# Patient Record
Sex: FEMALE | Race: ASIAN
[De-identification: names, ages, dates, MRNs, and addresses within clinical notes are randomized per-mention and may not be internally consistent; named-entity substitution may affect disease eponyms.]

---

## 2018-02-07 ENCOUNTER — HOSPITAL ENCOUNTER (INPATIENT)
Dept: HOSPITAL 72 - EMR | Age: 83
LOS: 5 days | Discharge: NURSING, REHAB OR LONG-TERM CARE | DRG: 871 | End: 2018-02-12
Payer: MEDICARE

## 2018-02-07 VITALS — DIASTOLIC BLOOD PRESSURE: 39 MMHG | SYSTOLIC BLOOD PRESSURE: 81 MMHG

## 2018-02-07 VITALS — HEIGHT: 65 IN | BODY MASS INDEX: 21.49 KG/M2 | WEIGHT: 129 LBS

## 2018-02-07 VITALS — DIASTOLIC BLOOD PRESSURE: 53 MMHG | SYSTOLIC BLOOD PRESSURE: 92 MMHG

## 2018-02-07 DIAGNOSIS — R65.21: ICD-10-CM

## 2018-02-07 DIAGNOSIS — K72.90: ICD-10-CM

## 2018-02-07 DIAGNOSIS — N17.9: ICD-10-CM

## 2018-02-07 DIAGNOSIS — F01.50: ICD-10-CM

## 2018-02-07 DIAGNOSIS — Z79.01: ICD-10-CM

## 2018-02-07 DIAGNOSIS — I11.0: ICD-10-CM

## 2018-02-07 DIAGNOSIS — K74.60: ICD-10-CM

## 2018-02-07 DIAGNOSIS — G92: ICD-10-CM

## 2018-02-07 DIAGNOSIS — E11.9: ICD-10-CM

## 2018-02-07 DIAGNOSIS — N39.0: ICD-10-CM

## 2018-02-07 DIAGNOSIS — Z95.0: ICD-10-CM

## 2018-02-07 DIAGNOSIS — J18.9: ICD-10-CM
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
## 2018-02-07 DIAGNOSIS — A41.9: Primary | ICD-10-CM

## 2018-02-07 DIAGNOSIS — I48.0: ICD-10-CM

## 2018-02-07 DIAGNOSIS — E43: ICD-10-CM

## 2018-02-07 DIAGNOSIS — I25.5: ICD-10-CM

## 2018-02-07 DIAGNOSIS — I50.33: ICD-10-CM

## 2018-02-07 DIAGNOSIS — I21.4: ICD-10-CM

## 2018-02-07 LAB
ADD MANUAL DIFF: NO
ALBUMIN SERPL-MCNC: 2.6 G/DL (ref 3.4–5)
ALBUMIN/GLOB SERPL: 0.9 {RATIO} (ref 1–2.7)
ALP SERPL-CCNC: 57 U/L (ref 46–116)
ALT SERPL-CCNC: 17 U/L (ref 12–78)
ANION GAP SERPL CALC-SCNC: 10 MMOL/L (ref 5–15)
APPEARANCE UR: CLEAR
APTT BLD: 27 SEC (ref 23–33)
APTT PPP: YELLOW S
AST SERPL-CCNC: 24 U/L (ref 15–37)
BASOPHILS NFR BLD AUTO: 0.7 % (ref 0–2)
BILIRUB SERPL-MCNC: 0.5 MG/DL (ref 0.2–1)
BUN SERPL-MCNC: 66 MG/DL (ref 7–18)
CALCIUM SERPL-MCNC: 8.9 MG/DL (ref 8.5–10.1)
CHLORIDE SERPL-SCNC: 102 MMOL/L (ref 98–107)
CK MB SERPL-MCNC: 1.8 NG/ML (ref 0–3.6)
CK SERPL-CCNC: 44 U/L (ref 26–308)
CO2 SERPL-SCNC: 20 MMOL/L (ref 21–32)
CREAT SERPL-MCNC: 3.1 MG/DL (ref 0.55–1.3)
EOSINOPHIL NFR BLD AUTO: 0.4 % (ref 0–3)
ERYTHROCYTE [DISTWIDTH] IN BLOOD BY AUTOMATED COUNT: 13.2 % (ref 11.6–14.8)
GLOBULIN SER-MCNC: 3 G/DL
GLUCOSE UR STRIP-MCNC: NEGATIVE MG/DL
HCT VFR BLD CALC: 38.8 % (ref 37–47)
HGB BLD-MCNC: 13.1 G/DL (ref 12–16)
INR PPP: 1 (ref 0.9–1.1)
KETONES UR QL STRIP: NEGATIVE
LEUKOCYTE ESTERASE UR QL STRIP: (no result)
LYMPHOCYTES NFR BLD AUTO: 27.8 % (ref 20–45)
MCV RBC AUTO: 100 FL (ref 80–99)
MONOCYTES NFR BLD AUTO: 9.5 % (ref 1–10)
NEUTROPHILS NFR BLD AUTO: 61.6 % (ref 45–75)
NITRITE UR QL STRIP: NEGATIVE
PH UR STRIP: 5 [PH] (ref 4.5–8)
PLATELET # BLD: 166 K/UL (ref 150–450)
POTASSIUM SERPL-SCNC: 4.3 MMOL/L (ref 3.5–5.1)
PROT UR QL STRIP: NEGATIVE
RBC # BLD AUTO: 3.88 M/UL (ref 4.2–5.4)
SODIUM SERPL-SCNC: 132 MMOL/L (ref 136–145)
SP GR UR STRIP: 1.01 (ref 1–1.03)
UROBILINOGEN UR-MCNC: 1 MG/DL (ref 0–1)
WBC # BLD AUTO: 8.4 K/UL (ref 4.8–10.8)

## 2018-02-07 PROCEDURE — 71045 X-RAY EXAM CHEST 1 VIEW: CPT

## 2018-02-07 PROCEDURE — 82553 CREATINE MB FRACTION: CPT

## 2018-02-07 PROCEDURE — 84443 ASSAY THYROID STIM HORMONE: CPT

## 2018-02-07 PROCEDURE — 85610 PROTHROMBIN TIME: CPT

## 2018-02-07 PROCEDURE — 84484 ASSAY OF TROPONIN QUANT: CPT

## 2018-02-07 PROCEDURE — 85025 COMPLETE CBC W/AUTO DIFF WBC: CPT

## 2018-02-07 PROCEDURE — 82962 GLUCOSE BLOOD TEST: CPT

## 2018-02-07 PROCEDURE — 70450 CT HEAD/BRAIN W/O DYE: CPT

## 2018-02-07 PROCEDURE — 36415 COLL VENOUS BLD VENIPUNCTURE: CPT

## 2018-02-07 PROCEDURE — 81003 URINALYSIS AUTO W/O SCOPE: CPT

## 2018-02-07 PROCEDURE — 93005 ELECTROCARDIOGRAM TRACING: CPT

## 2018-02-07 PROCEDURE — 83880 ASSAY OF NATRIURETIC PEPTIDE: CPT

## 2018-02-07 PROCEDURE — 82550 ASSAY OF CK (CPK): CPT

## 2018-02-07 PROCEDURE — 87081 CULTURE SCREEN ONLY: CPT

## 2018-02-07 PROCEDURE — 74230 X-RAY XM SWLNG FUNCJ C+: CPT

## 2018-02-07 PROCEDURE — 85730 THROMBOPLASTIN TIME PARTIAL: CPT

## 2018-02-07 PROCEDURE — 87040 BLOOD CULTURE FOR BACTERIA: CPT

## 2018-02-07 PROCEDURE — 80202 ASSAY OF VANCOMYCIN: CPT

## 2018-02-07 PROCEDURE — 93306 TTE W/DOPPLER COMPLETE: CPT

## 2018-02-07 PROCEDURE — 80053 COMPREHEN METABOLIC PANEL: CPT

## 2018-02-07 PROCEDURE — 76775 US EXAM ABDO BACK WALL LIM: CPT

## 2018-02-07 PROCEDURE — 82140 ASSAY OF AMMONIA: CPT

## 2018-02-07 PROCEDURE — 83605 ASSAY OF LACTIC ACID: CPT

## 2018-02-07 PROCEDURE — 51702 INSERT TEMP BLADDER CATH: CPT

## 2018-02-08 VITALS — SYSTOLIC BLOOD PRESSURE: 79 MMHG | DIASTOLIC BLOOD PRESSURE: 46 MMHG

## 2018-02-08 VITALS — SYSTOLIC BLOOD PRESSURE: 99 MMHG | DIASTOLIC BLOOD PRESSURE: 42 MMHG

## 2018-02-08 VITALS — DIASTOLIC BLOOD PRESSURE: 56 MMHG | SYSTOLIC BLOOD PRESSURE: 97 MMHG

## 2018-02-08 VITALS — DIASTOLIC BLOOD PRESSURE: 41 MMHG | SYSTOLIC BLOOD PRESSURE: 83 MMHG

## 2018-02-08 VITALS — DIASTOLIC BLOOD PRESSURE: 41 MMHG | SYSTOLIC BLOOD PRESSURE: 97 MMHG

## 2018-02-08 VITALS — SYSTOLIC BLOOD PRESSURE: 100 MMHG | DIASTOLIC BLOOD PRESSURE: 45 MMHG

## 2018-02-08 VITALS — DIASTOLIC BLOOD PRESSURE: 44 MMHG | SYSTOLIC BLOOD PRESSURE: 84 MMHG

## 2018-02-08 LAB
ADD MANUAL DIFF: NO
ALBUMIN SERPL-MCNC: 2.5 G/DL (ref 3.4–5)
ALBUMIN/GLOB SERPL: 0.8 {RATIO} (ref 1–2.7)
ALP SERPL-CCNC: 70 U/L (ref 46–116)
ALT SERPL-CCNC: 17 U/L (ref 12–78)
ANION GAP SERPL CALC-SCNC: 11 MMOL/L (ref 5–15)
AST SERPL-CCNC: 16 U/L (ref 15–37)
BASOPHILS NFR BLD AUTO: 0.6 % (ref 0–2)
BILIRUB SERPL-MCNC: 0.3 MG/DL (ref 0.2–1)
BUN SERPL-MCNC: 63 MG/DL (ref 7–18)
CALCIUM SERPL-MCNC: 8.1 MG/DL (ref 8.5–10.1)
CHLORIDE SERPL-SCNC: 107 MMOL/L (ref 98–107)
CO2 SERPL-SCNC: 18 MMOL/L (ref 21–32)
CREAT SERPL-MCNC: 2.4 MG/DL (ref 0.55–1.3)
EOSINOPHIL NFR BLD AUTO: 0.2 % (ref 0–3)
ERYTHROCYTE [DISTWIDTH] IN BLOOD BY AUTOMATED COUNT: 13.2 % (ref 11.6–14.8)
GLOBULIN SER-MCNC: 3.1 G/DL
HCT VFR BLD CALC: 40.1 % (ref 37–47)
HGB BLD-MCNC: 13.8 G/DL (ref 12–16)
INR PPP: 1 (ref 0.9–1.1)
LYMPHOCYTES NFR BLD AUTO: 20.7 % (ref 20–45)
MCV RBC AUTO: 102 FL (ref 80–99)
MONOCYTES NFR BLD AUTO: 7.1 % (ref 1–10)
NEUTROPHILS NFR BLD AUTO: 71.3 % (ref 45–75)
PLATELET # BLD: 149 K/UL (ref 150–450)
POTASSIUM SERPL-SCNC: 4.2 MMOL/L (ref 3.5–5.1)
RBC # BLD AUTO: 3.94 M/UL (ref 4.2–5.4)
SODIUM SERPL-SCNC: 136 MMOL/L (ref 136–145)
WBC # BLD AUTO: 9 K/UL (ref 4.8–10.8)

## 2018-02-08 RX ADMIN — SODIUM CHLORIDE SCH MLS/HR: 0.9 INJECTION INTRAVENOUS at 09:50

## 2018-02-08 RX ADMIN — INSULIN ASPART SCH UNITS: 100 INJECTION, SOLUTION INTRAVENOUS; SUBCUTANEOUS at 21:31

## 2018-02-08 RX ADMIN — INSULIN ASPART SCH UNITS: 100 INJECTION, SOLUTION INTRAVENOUS; SUBCUTANEOUS at 16:38

## 2018-02-08 NOTE — DIAGNOSTIC IMAGING REPORT
Indication: Altered mental status

 

Technique: Continuous helical CT scanning of the head was performed utilizing

automated exposure control without intravenous contrast material. Axial and coronal

reconstructions were obtained.

 

Comparison: None

 

CT dose: Total DLP 1397 mGycm; CTDI vol 70.5 mGy

 

Findings: There is no acute intracranial hemorrhage, mass effect or cortical edema.

The ventricles, cisterns and sulci are prominent consistent with atrophy.

Periventricular hypoattenuation is seen, a nonspecific finding. Probable remote

lacunar infarcts in the cerebellum. Bilateral basal ganglia calcifications noted.

 

Mastoid air cells are clear. Mucosal thickening noted in some ethmoid air cells.

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 No skull fracture. 

 

Impression: 

No evidence of acute intracranial hemorrhage, mass effect or cortical edema. Atrophy

and nonspecific periventricular hypoattenuation suggestive of chronic ischemic

microvascular changes. Acute on chronic ischemia not entirely excluded. MRI may be

obtained for more sensitive evaluation as clinically indicated.

 

This corresponds with the statrad preliminary report.

 

The CT scanner at [facility] is accredited by the American College of

Radiology and the scans are performed using protocols designed to limit radiation

exposure to as low as reasonably achievable to attain images of sufficient resolution

adequate for diagnostic evaluation.

## 2018-02-08 NOTE — CARDIOLOGY REPORT
--------------- APPROVED REPORT --------------





EKG Measurement

Heart 

[id number]

[id number]

[id number]

[id number]





AV sequential pacemaker

Abnormal ECG

## 2018-02-08 NOTE — EMERGENCY ROOM REPORT
History of Present Illness


General


Chief Complaint:  Generalized Weakness


Source:  Family Member, Medical Record, EMS, PMD





Present Illness


HPI


Is an 82-year-old [place] female with a history of hypertension, atrial 

fibrillation with a pacemaker.  She presented from nursing home with altered 

mental status.  Onset for couple days.  No fever or chills.  No nausea no 

vomiting.  No diarrhea.  Unknown chest pain.  History from the nursing of [facility] 

and recent admission to [facility] last month.


Allergies:  


Coded Allergies:  


     No Known Allergies (Unverified , 3/17/14)





Patient History


Past Medical History:  see triage record, old chart reviewed, HTN


Past Surgical History:  other


Pertinent Family History:  none


Social History:  Denies: smoking


Last Menstrual Period:  NA


Pregnant Now:  No


Immunizations:  other


Reviewed Nursing Documentation:  PMH: Agreed, PSxH: Agreed





Nursing Documentation-PMH


Hx Hypertension:  Yes


Hx Pacemaker:  Yes


Hx Diabetes:  Yes


Hx Cancer:  No





Review of Systems


Constitutional:  Reports: weakness


Eye:  Denies: eye pain, blurred vision


ENT:  Denies: ear pain, nose congestion, throat swelling


Respiratory:  Denies: cough, shortness of breath


Cardiovascular:  Denies: chest pain, palpitations


Gastrointestinal:  Denies: abdominal pain, diarrhea, nausea, vomiting


Musculoskeletal:  Denies: back pain, joint pain


Skin:  Denies: rash


Neurological:  Denies: headache, numbness


Endocrine:  Denies: increased thirst, increased urine


Hematologic/Lymphatic:  Denies: easy bruising


All Other Systems:  negative except mentioned in HPI





Physical Exam





Vital Signs








  Date Time  Temp Pulse Resp B/P (MAP) Pulse Ox O2 Delivery O2 Flow Rate FiO2


 


2/7/18 21:08 98.1 76 20 81/45 96 Nasal Cannula 2.0 





vitals with hypotension


Sp02 EP Interpretation:  reviewed, normal


General Appearance:  no apparent distress, lethargic


Head:  normocephalic, atraumatic


Eyes:  bilateral eye PERRL, bilateral eye EOMI


ENT:  hearing grossly normal, normal pharynx


Neck:  full range of motion, supple, no meningismus


Respiratory:  chest non-tender, lungs clear, normal breath sounds


Cardiovascular #1:  regular rate, rhythm, no murmur


Gastrointestinal:  normal bowel sounds, non tender, no mass, no organomegaly, 

no bruit, non-distended


Musculoskeletal:  back normal, normal range of motion


Neurologic:  grossly normal


Skin:  warm/dry





Procedures


Critical Care Time


Critical Care Time


Critical care is mandated in this patient who presented with hypotension.  

Patient require my urgent intervention to attenuate the risks of metabolic 

collapse which may lead to cardiovascular collapse and death.  Critical care 

time is 35 minutes excluding any reportable procedure.  Critical care time 

included evaluation, multiple reevaluation, looking at old charts, interpreting 

laboratory and diagnostic data, discussing case with patient and family and 

consultants, and charting.





Medical Decision Making


Diagnostic Impression:  


 Primary Impression:  


 Toxic metabolic encephalopathy


 Additional Impressions:  


 ARF (acute renal failure)


 Qualified Codes:  N17.9 - Acute kidney failure, unspecified


 Hypotension


 Qualified Codes:  I95.9 - Hypotension, unspecified


ER Course


Patient with otherwise weakness.  No obvious infection.  She does have some 

mild bacteria in the urine.  Will start on antibiotics.  Blood pressure 

improved with IV fluid.  I discussed the case with Dr. Cohen who will admit.


Lab Results Impression


labs unremarkable


EKG Diagnostic Results


Rate:  normal


Rhythm:  other - Paced


ST Segments:  no acute changes





Rhythm Strip Diag. Results


Rhythm Strip Time:  01:05


EP Interpretation:  yes


Rate:  70


Rhythm:  NSR, no PVC's, no ectopy





Chest X-Ray Diagnostic Results


Chest X-Ray Diagnostic Results :  


   Chest X-Ray Ordered:  Yes


   # of Views/Limited/Complete:  1 View


   Indication:  Chest Pain


   EP Interpretation:  Yes


   Interpretation:  no consolidation, no effusion, no pneumothorax, no acute 

cardiopulmonary disease


   Impression:  No acute disease


   Electronically Signed by:  Drake Barker MD





CT/MRI/US Diagnostic Results


CT/MRI/US Diagnostic Results :  


   Imaging Test Ordered:  ct head


   Impression


read by radiologist.  Neg





Last Vital Signs








  Date Time  Temp Pulse Resp B/P (MAP) Pulse Ox O2 Delivery O2 Flow Rate FiO2


 


2/8/18 00:50 98.1 70 16 97/56 99 Nasal Cannula 2.0 








Status:  improved


Disposition:  ADMITTED AS INPATIENT


Condition:  Serious


Referrals:  


NON PHYSICIAN (PCP)











DRAKE BARKER M.D. Feb 8, 2018 01:06

## 2018-02-08 NOTE — DIAGNOSTIC IMAGING REPORT
. Indication: Altered mental status

 

Technique: XRAY Chest 1v

 

Comparison: 3/25/2014

 

Findings: Heart is enlarged but stable in size. Atherosclerotic calcifications noted

in the aorta. These make her unchanged in position. There is mild interstitial

prominence/congestion. There is no focal airspace consolidation, pleural effusion or

pneumothorax. There is osteopenia and degenerative change of the spine. . There is

compression deformity of an upper lumbar vertebral body, likely L1. Evidence of prior

kyphoplasty at a level below that of  the compression deformity.

 

Impression: Cardiomegaly and mild interstitial opacification/edema. Correlate for

signs of CHF.

 

Age indeterminant upper lumbar compression fracture. 

 

Study obtained via the emergency department however patient admitted to the hospital

at time of dictation of the final report.

## 2018-02-09 VITALS — DIASTOLIC BLOOD PRESSURE: 76 MMHG | SYSTOLIC BLOOD PRESSURE: 120 MMHG

## 2018-02-09 VITALS — DIASTOLIC BLOOD PRESSURE: 43 MMHG | SYSTOLIC BLOOD PRESSURE: 104 MMHG

## 2018-02-09 VITALS — SYSTOLIC BLOOD PRESSURE: 118 MMHG | DIASTOLIC BLOOD PRESSURE: 55 MMHG

## 2018-02-09 VITALS — DIASTOLIC BLOOD PRESSURE: 64 MMHG | SYSTOLIC BLOOD PRESSURE: 114 MMHG

## 2018-02-09 VITALS — DIASTOLIC BLOOD PRESSURE: 52 MMHG | SYSTOLIC BLOOD PRESSURE: 98 MMHG

## 2018-02-09 VITALS — SYSTOLIC BLOOD PRESSURE: 117 MMHG | DIASTOLIC BLOOD PRESSURE: 65 MMHG

## 2018-02-09 LAB
ADD MANUAL DIFF: NO
ALBUMIN SERPL-MCNC: 2.4 G/DL (ref 3.4–5)
ALBUMIN/GLOB SERPL: 0.8 {RATIO} (ref 1–2.7)
ALP SERPL-CCNC: 48 U/L (ref 46–116)
ALT SERPL-CCNC: 10 U/L (ref 12–78)
ANION GAP SERPL CALC-SCNC: 7 MMOL/L (ref 5–15)
AST SERPL-CCNC: 15 U/L (ref 15–37)
BASOPHILS NFR BLD AUTO: 0.5 % (ref 0–2)
BILIRUB SERPL-MCNC: 0.4 MG/DL (ref 0.2–1)
BUN SERPL-MCNC: 56 MG/DL (ref 7–18)
CALCIUM SERPL-MCNC: 8.2 MG/DL (ref 8.5–10.1)
CHLORIDE SERPL-SCNC: 114 MMOL/L (ref 98–107)
CO2 SERPL-SCNC: 19 MMOL/L (ref 21–32)
CREAT SERPL-MCNC: 1.6 MG/DL (ref 0.55–1.3)
EOSINOPHIL NFR BLD AUTO: 0.6 % (ref 0–3)
ERYTHROCYTE [DISTWIDTH] IN BLOOD BY AUTOMATED COUNT: 13.5 % (ref 11.6–14.8)
GLOBULIN SER-MCNC: 3.1 G/DL
HCT VFR BLD CALC: 37.5 % (ref 37–47)
HGB BLD-MCNC: 12.7 G/DL (ref 12–16)
INR PPP: 2.1 (ref 0.9–1.1)
LYMPHOCYTES NFR BLD AUTO: 23.1 % (ref 20–45)
MCV RBC AUTO: 102 FL (ref 80–99)
MONOCYTES NFR BLD AUTO: 6.1 % (ref 1–10)
NEUTROPHILS NFR BLD AUTO: 69.7 % (ref 45–75)
PLATELET # BLD: 140 K/UL (ref 150–450)
POTASSIUM SERPL-SCNC: 4.5 MMOL/L (ref 3.5–5.1)
RBC # BLD AUTO: 3.66 M/UL (ref 4.2–5.4)
SODIUM SERPL-SCNC: 140 MMOL/L (ref 136–145)
WBC # BLD AUTO: 7.5 K/UL (ref 4.8–10.8)

## 2018-02-09 RX ADMIN — SODIUM CHLORIDE SCH MLS/HR: 0.9 INJECTION INTRAVENOUS at 09:03

## 2018-02-09 RX ADMIN — LACTULOSE SCH GM: 20 SOLUTION ORAL at 18:47

## 2018-02-09 RX ADMIN — INSULIN ASPART SCH UNITS: 100 INJECTION, SOLUTION INTRAVENOUS; SUBCUTANEOUS at 20:55

## 2018-02-09 RX ADMIN — INSULIN ASPART SCH UNITS: 100 INJECTION, SOLUTION INTRAVENOUS; SUBCUTANEOUS at 16:51

## 2018-02-09 RX ADMIN — LACTULOSE SCH GM: 10 SOLUTION ORAL at 11:14

## 2018-02-09 RX ADMIN — LACTULOSE SCH GM: 10 SOLUTION ORAL at 13:14

## 2018-02-09 RX ADMIN — LACTULOSE SCH GM: 20 SOLUTION ORAL at 14:45

## 2018-02-09 RX ADMIN — INSULIN ASPART SCH UNITS: 100 INJECTION, SOLUTION INTRAVENOUS; SUBCUTANEOUS at 11:25

## 2018-02-09 RX ADMIN — INSULIN ASPART SCH UNITS: 100 INJECTION, SOLUTION INTRAVENOUS; SUBCUTANEOUS at 06:29

## 2018-02-09 NOTE — GENERAL PROGRESS NOTE
Assessment/Plan


Problem List:  


(1) Cirrhosis


ICD Codes:  K74.60 - Unspecified cirrhosis of liver


SNOMED:  69055212


(2) Hepatic encephalopathy


ICD Codes:  K72.90 - Hepatic failure, unspecified without coma


SNOMED:  10600827


(3) Anemia


(4) Acute CHF


(5) Respiratory failure, acute


(6) Hypokalemia


(7) Hypotension


ICD Codes:  I95.9 - Hypotension, unspecified


SNOMED:  39984711


Qualifiers:  


   Qualified Codes:  I95.9 - Hypotension, unspecified


(8) Toxic metabolic encephalopathy


ICD Codes:  G92 - Toxic encephalopathy


SNOMED:  535285783


(9) ARF (acute renal failure)


ICD Codes:  N17.9 - Acute kidney failure, unspecified


SNOMED:  42048219


Qualifiers:  


   Qualified Codes:  N17.9 - Acute kidney failure, unspecified


Status:  stable


Assessment/Plan


swallow eval


ivf


iv abx


follow up cultures


monitor cxr


lactulose added


monitor renal fxn





Subjective


ROS Limited/Unobtainable:  No


Constitutional:  Reports: malaise, weakness


HEENT:  Reports: no symptoms


Cardiovascular:  Reports: no symptoms


Respiratory:  Reports: no symptoms


Gastrointestinal/Abdominal:  Reports: no symptoms


Genitourinary:  Reports: no symptoms


Neurologic/Psychiatric:  Reports: pre-existing deficit


Endocrine:  Reports: no symptoms


Hematologic/Lymphatic:  Reports: no symptoms


Allergies:  


Coded Allergies:  


     No Known Allergies (Unverified , 3/17/14)


All Systems:  reviewed and negative except above


Subjective


no events. bp better. no fever or chills.





Objective





Last 24 Hour Vital Signs








  Date Time  Temp Pulse Resp B/P (MAP) Pulse Ox O2 Delivery O2 Flow Rate FiO2


 


2/9/18 08:00 97.9 70 20 117/65 95 Room Air  


 


2/9/18 04:00  70      


 


2/9/18 04:00 99.0 66 20 104/43 96 Room Air  


 


2/9/18 00:00 99.1 70 18 98/52 95 Room Air  


 


2/9/18 00:00  70      


 


2/8/18 20:00  70      


 


2/8/18 20:00 98.3 72 20 100/45 96 Room Air  


 


2/8/18 16:00 97.9 69 20 99/42 94 Room Air  


 


2/8/18 16:00  70      


 


2/8/18 14:45    84/44    


 


2/8/18 13:39    83/41    


 


2/8/18 12:00  70      


 


2/8/18 12:00 96.8 72 18 79/46 92 Room Air  

















Intake and Output  


 


 2/8/18 2/9/18





 19:00 07:00


 


Intake Total 1430 ml 


 


Output Total 450 ml 800 ml


 


Balance 980 ml -800 ml


 


  


 


Intake IV Total 1430 ml 


 


Output Urine Total 450 ml 800 ml


 


# Bowel Movements 1 








Laboratory Tests


2/9/18 08:25: 


White Blood Count 7.5, Red Blood Count 3.66L, Hemoglobin 12.7, Hematocrit 37.5, 

Mean Corpuscular Volume 102H, Mean Corpuscular Hemoglobin 34.8H, Mean 

Corpuscular Hemoglobin Concent 34.0, Red Cell Distribution Width 13.5, Platelet 

Count 140L, Mean Platelet Volume 5.9L, Neutrophils (%) (Auto) 69.7, Lymphocytes 

(%) (Auto) 23.1, Monocytes (%) (Auto) 6.1, Eosinophils (%) (Auto) 0.6, 

Basophils (%) (Auto) 0.5, Sodium Level 140, Potassium Level 4.5, Chloride Level 

114H, Carbon Dioxide Level 19L, Anion Gap 7, Blood Urea Nitrogen 56H, 

Creatinine 1.6H, Estimat Glomerular Filtration Rate , Glucose Level 136H, 

Calcium Level 8.2L, Total Bilirubin 0.4, Aspartate Amino Transf (AST/SGOT) 15, 

Alanine Aminotransferase (ALT/SGPT) 10L, Alkaline Phosphatase 48, Ammonia 70H, 

Total Protein 5.5L, Albumin 2.4L, Globulin 3.1, Albumin/Globulin Ratio 0.8L, 

Thyroid Stimulating Hormone (TSH) 0.117L


Height (Feet):  5


Height (Inches):  5.00


Weight (Pounds):  129


General Appearance:  WD/WN, alert


Neck:  supple


Cardiovascular:  normal rate, regular rhythm


Respiratory/Chest:  chest wall non-tender, lungs clear, normal breath sounds, 

no respiratory distress


Abdomen:  normal bowel sounds, non tender, soft, no organomegaly


Edema:  no edema noted Arm (L), no edema noted Arm (R), no edema noted Leg (L), 

no edema noted Leg (R), no edema noted Pedal (L), no edema noted Pedal (R), no 

edema noted Generalized











EDUAR PAT Feb 9, 2018 10:21

## 2018-02-09 NOTE — CONSULTATION
DATE OF CONSULTATION:  02/09/2018



NEPHROLOGY CONSULTATION



CONSULTING PHYSICIAN:  Nish Simental M.D.



REFERRING PHYSICIAN:  Campbell Khalil M.D. and John Cohen M.D.



CHIEF COMPLAINT/REASON FOR CONSULTATION:  Elevated BUN and creatinine.



HISTORY OF PRESENT ILLNESS:  The patient with a history of cirrhosis,

hypertension, atrial fibrillation, pacemaker, dementia, and ischemic

cardiomyopathy.  She has elevated BUN and creatinine, for which I am asked

to evaluate her.  She has altered mental status on this admission.  She

was also recently in [facility] admitted Saturday,

01/27/2018 with altered mental status and had evidence of cirrhosis on CT

scanning.  She apparently has a history of hepatitis C as well.  The

patient had a BUN of 23 and a creatinine of 0.9 per notes 01/26/2018.  On

this admission, she presented with BUN 66 and a creatinine of 2.1 on

02/07/2018 and current BUN 56, creatinine 1.6, sodium 140, and potassium

4.5.  She is unable to give history.



PAST SURGICAL HISTORY:  Pacemaker.



MEDICATIONS:  Reviewed on the computer.  She is unable to give any further

history.



ALLERGIES:  None known.



PHYSICAL EXAMINATION:

GENERAL:  The patient is lying in bed, in no acute distress.

VITAL SIGNS:  Temperature 97.9 degrees, pulse 70, respirations 21, blood

pressure 114/64, and O2 saturation _____.

HEENT:  Keeps her eyes closed during the exam.  Oral mucosa is slightly

dry.

NECK:  No adenopathy.

LUNGS:  Few faint rhonchi.

HEART:  Rhythm is regular.  I hear no murmur.

ABDOMEN:  Soft.  There is appearance of ascites.

EXTREMITIES:  Show trace to 1+ edema.

NEUROLOGIC:  The patient is drowsy.  She moves all extremities.  She is

non-English speaking.  I am unable to assess her mental status.



LABORATORY DATA:  Review of pertinent labs, see the history of present

illness.  She has an albumin of 2.4. TSH of 0.117.  Her urinalysis shows

negative protein, 0-2 red cells, and 2-4 white cells per high-power field.

White count 7.5 and hemoglobin is 12.7.



IMPRESSION:  The patient has acute kidney injury over the last one to two

weeks, most likely this is due to prior diuretics.  She also has low

albumin and cirrhosis and questionable congestive heart failure.



PLAN:  At this time, we will hydrate her gradually.  Watch closely for any

comorbidities.  Repeat chest x-ray.  Get a renal ultrasound.  Avoid

nephrotoxic agents.  Her overall condition is poor with multiorgan

involvement and it is possible she has kidney injury due to sepsis or

other cause.  She will be evaluated for the above and have appropriate

cultures and studies done.  We will follow closely in view of her

comorbidities.









  ______________________________________________

  Nish Simental M.D.





DR:  NOLAN

D:  02/09/2018 14:26

T:  02/09/2018 20:10

JOB#:  [id number]

CC:

## 2018-02-09 NOTE — HISTORY AND PHYSICAL REPORT
DATE OF ADMISSION:  02/07/2018



CHIEF COMPLAINT:  Sepsis and altered mental status.



HISTORY OF PRESENT ILLNESS:  The patient is an unfortunate female with a

history of cirrhosis, hypertension, pacemaker, dementia, atrial

fibrillation, and ischemic cardiomyopathy.  She was admitted with

complaints of altered mental status.  The patient is confused and unable

to provide any history.  On evaluation in the emergency room, the

patient's laboratories are significant for a creatinine of 3, sodium of

132.  Her troponin was elevated at 0.093.  She had x-ray evidence of

bilateral infiltrates consistent with pneumonia.  The patient has been

started on broad-spectrum IV antibiotics.  She has been pancultured.  She

is now admitted for further evaluation and care.



PAST MEDICAL HISTORY:  As above.



PAST SURGICAL HISTORY:  None.



CURRENT MEDICATIONS:  Reconciled and reviewed.



ALLERGIES:  None.



FAMILY HISTORY:  None.



SOCIAL HISTORY:  There is no known history of tobacco, ethanol, or drugs.



REVIEW OF SYSTEMS:  Unobtainable.



PHYSICAL EXAMINATION:

GENERAL:  The patient is a thin female, in no apparent distress.  She is

awake and does answer some simple questions, follows commands.

VITAL SIGNS:  Temperature 98 degrees, pulse 70, respirations 16, and blood

pressure 92/53.

NECK:  Supple.  No jugular venous distention.

HEART:  Regular rate and rhythm.

LUNGS:  Show significant scattered rhonchi.

ABDOMEN:  Soft, nontender, and nondistended.

EXTREMITIES:  Without clubbing or cyanosis.



LABORATORY AND DIAGNOSTIC DATA:  Chest x-ray, bibasilar infiltrates.  White

count was 8, hemoglobin 13, hematocrit 38, and platelet count of 162.

Sodium is 132, BUN 66, and creatinine was 3.1.  Lactic acid was 1.6.

Troponin 0.093.



ASSESSMENT:  This is an unfortunate female with a history of atrial

fibrillation, ischemic cardiomyopathy, and cirrhosis, admitted with acute

renal failure and sepsis secondary to pneumonia.



PLAN:  Cautious hydration.  Monitor volume status closely.  Broad-spectrum

IV antibiotics.  Follow up cultures.  Check an ammonia level.  Cardiology,

Renal, Pulmonary, and ID consultations to be obtained.  The patient's

status is currently guarded.









  ______________________________________________

  Campbell Khalil M.D.





DR:  PAULETTE

D:  02/08/2018 21:50

T:  02/09/2018 02:11

JOB#:  [id number]

CC:

## 2018-02-09 NOTE — PROGRESS NOTE
DATE:  02/08/2018



CARDIOLOGY PROGRESS NOTE



SUBJECTIVE:  The patient was seen last evening in the emergency room.  She

remains withdrawn and lethargic although somewhat more responsive today.

She continues to have episodes of low blood pressure readings, below 90

systolic.



OBJECTIVE:

VITAL SIGNS:  Blood pressure 79/46, pulse 72, and respiratory rate 18.

Presently afebrile.  Oxygen saturation on room air is 92%.

HEENT:  Temporal wasting.  Dry mucous membranes.

LUNGS:  Bilateral breath sounds with scattered rhonchi.

HEART:  Regular rhythm and rate.  Normal S1 and S2 with a fourth heart

sound and a 1/6 systolic murmur at the base.

ABDOMEN:  Soft and nontender.

EXTREMITIES:  Without edema.  Capillary refill is diminished.



LABORATORY DATA:  Chest x-ray with bilateral infiltrates.  Cardiac monitor

reveals a paced rhythm.  White count 9 and hemoglobin 13.8.  Sodium 136,

potassium 4.2, bicarbonate 18, BUN 63, and creatinine 2.4.  Troponin

0.087.  Albumin 2.5.  Pro-natriuretic peptide over 10,000.



IMPRESSION:

1. Healthcare-acquired pneumonia.

2. Sepsis, shock.

3. Acute renal failure.

4. Hypertensive cardiomyopathy.

5. Acute myocardial infarction.

6. Moderate-to-severe protein-calorie malnutrition.

7. Acute on chronic diastolic congestive heart failure.



PLAN:

1. Continue hydration with isotonic saline.

2. Empiric broad-spectrum antibiotics.

3. Follow up culture results.

4. Fluid challenge for low blood pressure, hope to avoid pressors.

5. Serial troponin.

6. DVT prophylaxis.

7. Respiratory hygiene.

8. Monitor cardiorenal parameters and volume status.









  ______________________________________________

  John Cohen M.D.





DR:  DK/PM

D:  02/09/2018 03:55

T:  02/09/2018 06:15

JOB#:  [id number]

CC:

## 2018-02-09 NOTE — CARDIOLOGY REPORT
--------------- APPROVED REPORT --------------





EXAM: Two-dimensional and M-mode echocardiogram with Doppler and color 

Doppler.



INDICATION

Congestive Heart Failure 



M-Mode DIMENSIONS 

IVSd1.4 (0.7-1.1cm)Left Atrium (MM)5.4 (1.6-4.0cm)

LVDd4.8 (3.5-5.6cm)Aortic Root3.1 (2.0-3.7cm)

PWd1.5 (0.7-1.1cm)Aortic Cusp Exc.1.8 (1.5-2.0cm)



LVDs2.9 (2.5-4.0cm)

PWs1.9 cm





Technically difficult study due to poor acoustical windows.

Normal left ventricular chamber size, systolic function and wall motion.

Left ventricular ejection fraction estimated to be 60-65%.

No evidence of  left ventricular hypertrophy.

No evidence of pericardial or pleural effusion.

Right cardiac chamber sizes are within normal limits.

Moderate left atrial enlargement by 2D.

Focal aortic valve sclerosis with adequate cusp excursion.

Thickened mitral valve leaflets with normal excursion.

Mild mitral annulus and aortic root calcification.

Pulmonic valve is well visualized.

Normal tricuspid valve structure.

IVC is normal in size and collapsible with respiration.

Probable pacemaker wire present in the right side chambers.



A  color flow and spectral Doppler study was performed and revealed:

No aortic regurgitation.

Mild mitral regurgitation.

Mitral inflow velocities indicates possible pseudo normalization pattern implying 

significant left ventricular diastolic dysfunction.

Moderate to severe tricuspid regurgitation.

Tricuspid  systolic velocities suggests peak right ventricular systolic pressure of 40 

mmHg

Consistent with mild pulmonary hypertension.

## 2018-02-10 VITALS — DIASTOLIC BLOOD PRESSURE: 68 MMHG | SYSTOLIC BLOOD PRESSURE: 123 MMHG

## 2018-02-10 VITALS — SYSTOLIC BLOOD PRESSURE: 107 MMHG | DIASTOLIC BLOOD PRESSURE: 72 MMHG

## 2018-02-10 VITALS — SYSTOLIC BLOOD PRESSURE: 116 MMHG | DIASTOLIC BLOOD PRESSURE: 62 MMHG

## 2018-02-10 VITALS — SYSTOLIC BLOOD PRESSURE: 108 MMHG | DIASTOLIC BLOOD PRESSURE: 56 MMHG

## 2018-02-10 VITALS — DIASTOLIC BLOOD PRESSURE: 54 MMHG | SYSTOLIC BLOOD PRESSURE: 101 MMHG

## 2018-02-10 VITALS — DIASTOLIC BLOOD PRESSURE: 82 MMHG | SYSTOLIC BLOOD PRESSURE: 147 MMHG

## 2018-02-10 LAB
ADD MANUAL DIFF: NO
ALBUMIN SERPL-MCNC: 2.5 G/DL (ref 3.4–5)
ALBUMIN/GLOB SERPL: 0.8 {RATIO} (ref 1–2.7)
ALP SERPL-CCNC: 45 U/L (ref 46–116)
ALT SERPL-CCNC: 17 U/L (ref 12–78)
ANION GAP SERPL CALC-SCNC: 10 MMOL/L (ref 5–15)
AST SERPL-CCNC: 18 U/L (ref 15–37)
BASOPHILS NFR BLD AUTO: 0.7 % (ref 0–2)
BILIRUB SERPL-MCNC: 0.5 MG/DL (ref 0.2–1)
BUN SERPL-MCNC: 40 MG/DL (ref 7–18)
CALCIUM SERPL-MCNC: 8.5 MG/DL (ref 8.5–10.1)
CHLORIDE SERPL-SCNC: 114 MMOL/L (ref 98–107)
CO2 SERPL-SCNC: 18 MMOL/L (ref 21–32)
CREAT SERPL-MCNC: 1 MG/DL (ref 0.55–1.3)
EOSINOPHIL NFR BLD AUTO: 0.7 % (ref 0–3)
ERYTHROCYTE [DISTWIDTH] IN BLOOD BY AUTOMATED COUNT: 13.8 % (ref 11.6–14.8)
GLOBULIN SER-MCNC: 3 G/DL
HCT VFR BLD CALC: 37.9 % (ref 37–47)
HGB BLD-MCNC: 12.5 G/DL (ref 12–16)
INR PPP: 2.6 (ref 0.9–1.1)
LYMPHOCYTES NFR BLD AUTO: 27 % (ref 20–45)
MCV RBC AUTO: 103 FL (ref 80–99)
MONOCYTES NFR BLD AUTO: 6.6 % (ref 1–10)
NEUTROPHILS NFR BLD AUTO: 64.9 % (ref 45–75)
PLATELET # BLD: 139 K/UL (ref 150–450)
POTASSIUM SERPL-SCNC: 4.3 MMOL/L (ref 3.5–5.1)
RBC # BLD AUTO: 3.67 M/UL (ref 4.2–5.4)
SODIUM SERPL-SCNC: 142 MMOL/L (ref 136–145)
WBC # BLD AUTO: 6.8 K/UL (ref 4.8–10.8)

## 2018-02-10 RX ADMIN — INSULIN ASPART SCH UNITS: 100 INJECTION, SOLUTION INTRAVENOUS; SUBCUTANEOUS at 21:00

## 2018-02-10 RX ADMIN — INSULIN ASPART SCH UNITS: 100 INJECTION, SOLUTION INTRAVENOUS; SUBCUTANEOUS at 12:53

## 2018-02-10 RX ADMIN — LACTULOSE SCH GM: 20 SOLUTION ORAL at 10:02

## 2018-02-10 RX ADMIN — LACTULOSE SCH GM: 20 SOLUTION ORAL at 12:58

## 2018-02-10 RX ADMIN — Medication SCH MLS/HR: at 15:04

## 2018-02-10 RX ADMIN — SODIUM CHLORIDE SCH MLS/HR: 0.9 INJECTION INTRAVENOUS at 10:02

## 2018-02-10 RX ADMIN — INSULIN ASPART SCH UNITS: 100 INJECTION, SOLUTION INTRAVENOUS; SUBCUTANEOUS at 06:23

## 2018-02-10 RX ADMIN — INSULIN ASPART SCH UNITS: 100 INJECTION, SOLUTION INTRAVENOUS; SUBCUTANEOUS at 17:21

## 2018-02-10 RX ADMIN — LACTULOSE SCH GM: 20 SOLUTION ORAL at 17:21

## 2018-02-10 NOTE — DIAGNOSTIC IMAGING REPORT
Reason For Exam: COUGH

 

Technique: XRAY Chest 1v

 

Comparison: 2/7/2019

 

Findings: Heart size and mediastinal contours are stable. Left-sided dual-lead pacer

unchanged in position. There is mild interstitial edema/opacification. Interval

development of streaky opacities at the right base thought to represent subsegmental

atelectasis. Age-indeterminate lumbar compression fracture noted on prior exams not

definitively seen , likely related to under penetration on today's exam. No

pneumothorax.

 

Impression: Cardiomegaly and very mild interstitial prominence. Development of

streaky opacity at the right base thought to represent subsegmental atelectasis.

Developing infiltrate cannot entirely excluded. Clinical correlation and follow-up

exam recommended.

## 2018-02-10 NOTE — NEPHROLOGY PROGRESS NOTE
Assessment/Plan


Problem List:  


(1) Hepatic encephalopathy


(2) Cirrhosis


(3) ARF (acute renal failure)


(4) Acute CHF


Plan


gibran lab improving, likely prerenal.  Stop iv to avoid fluid overload.  Edema 

from cirrhosis and malnutrition. Continue lactulose for enceph





Subjective


ROS Limited/Unobtainable:  Yes





Objective


Objective





Last 24 Hour Vital Signs








  Date Time  Temp Pulse Resp B/P (MAP) Pulse Ox O2 Delivery O2 Flow Rate FiO2


 


2/10/18 12:00 97.3 70 20 107/72 99 Room Air  


 


2/10/18 12:00  70      


 


2/10/18 08:00  70      


 


2/10/18 08:00 97.3 89 18 147/82 95 Room Air  


 


2/10/18 04:00 98.1 70 19 108/56 94 Room Air  


 


2/10/18 04:00  70      


 


2/10/18 00:00  70      


 


2/10/18 00:00 96.8 70 20 101/54 95 Room Air  


 


2/9/18 21:00  70      


 


2/9/18 20:00 97.3 70 22 120/76 93 Room Air  


 


2/9/18 16:00  70      


 


2/9/18 16:00 97.9 70 21 118/55 95 Room Air  

















Intake and Output  


 


 2/9/18 2/10/18





 19:00 07:00


 


Intake Total 1936 ml 1000 ml


 


Output Total 1100 ml 1000 ml


 


Balance 836 ml 0 ml


 


  


 


Intake Oral 400 ml 


 


IV Total 1536 ml 1000 ml


 


Output Urine Total 1100 ml 1000 ml


 


# Bowel Movements  1








Laboratory Tests


2/9/18 16:55: 


Prothrombin Time 22.6H, Prothromb Time International Ratio 2.1H


2/10/18 08:30: 


Prothrombin Time 27.4H, Prothromb Time International Ratio 2.6H, White Blood 

Count 6.8, Red Blood Count 3.67L, Hemoglobin 12.5, Hematocrit 37.9, Mean 

Corpuscular Volume 103H, Mean Corpuscular Hemoglobin 34.1H, Mean Corpuscular 

Hemoglobin Concent 33.1, Red Cell Distribution Width 13.8, Platelet Count 139L, 

Mean Platelet Volume 5.4L, Neutrophils (%) (Auto) 64.9, Lymphocytes (%) (Auto) 

27.0, Monocytes (%) (Auto) 6.6, Eosinophils (%) (Auto) 0.7, Basophils (%) (Auto

) 0.7, Sodium Level 142, Potassium Level 4.3, Chloride Level 114H, Carbon 

Dioxide Level 18L, Anion Gap 10, Blood Urea Nitrogen 40H, Creatinine 1.0, 

Estimat Glomerular Filtration Rate , Glucose Level 156H, Calcium Level 8.5, 

Total Bilirubin 0.5, Aspartate Amino Transf (AST/SGOT) 18, Alanine 

Aminotransferase (ALT/SGPT) 17, Alkaline Phosphatase 45L, Ammonia 54H, Pro-B-

Type Natriuretic Peptide 7728H, Total Protein 5.5L, Albumin 2.5L, Globulin 3.0, 

Albumin/Globulin Ratio 0.8L, Random Vancomycin Level 4.6


Height (Feet):  5


Height (Inches):  5.00


Weight (Pounds):  129


General Appearance:  alert, confused


EENT:  normal ENT inspection


Neck:  normal alignment


Cardiovascular:  normal rate


Respiratory/Chest:  rhonchi - bilaterally


Abdomen:  distended


Extremities:  moderate edema


Neurologic:  disoriented











MUKESH CARO Feb 10, 2018 13:49

## 2018-02-10 NOTE — PROGRESS NOTE
DATE:  02/10/2018



CARDIOLOGY PROGRESS NOTE



SUBJECTIVE:  The patient is more alert and interactive.  Blood pressure

parameters are still tenuous, but are improving steadily.



OBJECTIVE:

VITAL SIGNS:  Blood pressure 98/52 to 118/55, heart rate 70, and

respiratory rate 20.  The patient is afebrile.  T-max 99.1 degrees.

Monitored rhythm is paced.

HEENT:  Dry mucous membranes.

LUNGS:  Few rhonchi.

CARDIAC:  Regular rhythm and rate.  Normal S1, S2.  A 1/6 systolic murmur

at apex.

ABDOMEN:  Soft.

EXTREMITIES:  There is 1+ dependent edema.  The patient moves all

extremities.



LABORATORY AND DIAGNOSTIC DATA:  Labs are reviewed.  BUN and creatinine

have improved, 56 and 1.6.  Ammonia level is 70.



IMPRESSION:

1. Urinary tract infection.

2. Sepsis with shock.

3. Acute renal failure.

4. Acute myocardial infarction.

5. Metabolic encephalopathy.

6. Elevated ammonia level.

7. Severe protein-calorie malnutrition.

8. Permanent pacemaker.

9. Paroxysmal atrial fibrillation.



PLAN:

1. Continue intravenous fluid hydration.

2. Monitor volume status and cardiorenal parameters.

3. Antimicrobials.

4. Follow up culture results and sensitivities.

5. Consider addition of lactulose.

6. DVT and stress ulcer prophylaxis.

7. We will attempt to obtain records regarding prior pacemaker

interrogation.









  ______________________________________________

  John Cohen M.D.





DR:  Isela

D:  02/10/2018 00:18

T:  02/10/2018 04:04

JOB#:  [id number]

CC:

## 2018-02-10 NOTE — GENERAL PROGRESS NOTE
Assessment/Plan


Problem List:  


(1) Cirrhosis


ICD Codes:  K74.60 - Unspecified cirrhosis of liver


SNOMED:  58547245


(2) Hepatic encephalopathy


ICD Codes:  K72.90 - Hepatic failure, unspecified without coma


SNOMED:  69412245


(3) Anemia


(4) Acute CHF


(5) Respiratory failure, acute


(6) Hypokalemia


(7) Hypotension


ICD Codes:  I95.9 - Hypotension, unspecified


SNOMED:  89429185


Qualifiers:  


   Qualified Codes:  I95.9 - Hypotension, unspecified


(8) Toxic metabolic encephalopathy


ICD Codes:  G92 - Toxic encephalopathy


SNOMED:  245978839


(9) ARF (acute renal failure)


ICD Codes:  N17.9 - Acute kidney failure, unspecified


SNOMED:  14693941


Qualifiers:  


   Qualified Codes:  N17.9 - Acute kidney failure, unspecified


Status:  stable, progressing


Assessment/Plan


swallow eval noted


pos with caution


ivf per renal


follow up labs- pending for today


iv abx


follow up cultures


monitor cxr


lactulose added


monitor renal fxn


compliance stressed





Subjective


ROS Limited/Unobtainable:  Yes


Constitutional:  Reports: malaise, weakness


HEENT:  Reports: no symptoms


Cardiovascular:  Reports: no symptoms


Respiratory:  Reports: no symptoms


Gastrointestinal/Abdominal:  Reports: no symptoms


Genitourinary:  Reports: no symptoms


Neurologic/Psychiatric:  Reports: pre-existing deficit


Endocrine:  Reports: no symptoms


Hematologic/Lymphatic:  Reports: no symptoms


Allergies:  


Coded Allergies:  


     No Known Allergies (Unverified , 3/17/14)


All Systems:  reviewed and negative except above


Subjective


no events. bp better. no fever or chills. not compliant with all meds





Objective





Last 24 Hour Vital Signs








  Date Time  Temp Pulse Resp B/P (MAP) Pulse Ox O2 Delivery O2 Flow Rate FiO2


 


2/10/18 04:00 98.1 70 19 108/56 94 Room Air  


 


2/10/18 04:00  70      


 


2/10/18 00:00  70      


 


2/10/18 00:00 96.8 70 20 101/54 95 Room Air  


 


2/9/18 21:00  70      


 


2/9/18 20:00 97.3 70 22 120/76 93 Room Air  


 


2/9/18 16:00  70      


 


2/9/18 16:00 97.9 70 21 118/55 95 Room Air  


 


2/9/18 12:00 97.9 70 21 114/64 93 Room Air  


 


2/9/18 12:00  70      

















Intake and Output  


 


 2/9/18 2/10/18





 19:00 07:00


 


Intake Total 1936 ml 1000 ml


 


Output Total 1100 ml 1000 ml


 


Balance 836 ml 0 ml


 


  


 


Intake Oral 400 ml 


 


IV Total 1536 ml 1000 ml


 


Output Urine Total 1100 ml 1000 ml


 


# Bowel Movements  1








Laboratory Tests


2/9/18 16:55: 


Prothrombin Time 22.6H, Prothromb Time International Ratio 2.1H


2/10/18 08:30: 


Prothrombin Time [Pending], Prothromb Time International Ratio [Pending], White 

Blood Count [Pending], Red Blood Count [Pending], Hemoglobin [Pending], 

Hematocrit [Pending], Mean Corpuscular Volume [Pending], Mean Corpuscular 

Hemoglobin [Pending], Mean Corpuscular Hemoglobin Concent [Pending], Red Cell 

Distribution Width [Pending], Platelet Count [Pending], Mean Platelet Volume [

Pending], Neutrophils (%) (Auto) [Pending], Lymphocytes (%) (Auto) [Pending], 

Monocytes (%) (Auto) [Pending], Eosinophils (%) (Auto) [Pending], Basophils (%) 

(Auto) [Pending], Sodium Level [Pending], Potassium Level [Pending], Chloride 

Level [Pending], Carbon Dioxide Level [Pending], Blood Urea Nitrogen [Pending], 

Creatinine [Pending], Estimat Glomerular Filtration Rate [Pending], Glucose 

Level [Pending], Calcium Level [Pending], Total Bilirubin [Pending], Aspartate 

Amino Transf (AST/SGOT) [Pending], Alanine Aminotransferase (ALT/SGPT) [Pending]

, Alkaline Phosphatase [Pending], Ammonia [Pending], Pro-B-Type Natriuretic 

Peptide [Pending], Total Protein [Pending], Albumin [Pending], Globulin [Pending

], Random Vancomycin Level [Pending]


Height (Feet):  5


Height (Inches):  5.00


Weight (Pounds):  129


Objective


General Appearance:  WD/WN, alert


Neck:  supple


Cardiovascular:  normal rate, regular rhythm


Respiratory/Chest:  chest wall non-tender, lungs clear, normal breath sounds, 

no respiratory distress


Abdomen:  normal bowel sounds, non tender, soft, no organomegaly


Edema:  no edema noted Arm (L), no edema noted Arm (R), no edema noted Leg (L), 

no edema noted Leg (R), no edema noted Pedal (L), no edema noted Pedal (R), no 

edema noted Generalized











EDUAR PAT Feb 10, 2018 09:26

## 2018-02-10 NOTE — DIAGNOSTIC IMAGING REPORT
Indication: Renal failure

 

Technique: Multiplanar grayscale and Doppler imaging of the kidneys and bladder

 

Comparison: None

 

Findings:

Limited exam due to patient body habitus and lack of cooperation with exam

positioning.

 

An anechoic well-circumscribed structure with posterior acoustic enhancement

compatible with a cyst in the right kidney measures 2.1 cm. No hydronephrosis or

sonographically appreciable renal stones bilaterally. Parenchymal echogenicity

appears grossly normal. Bladder is decompressed, limiting its evaluation.

 

 

Impression: 

Limited evaluation. No evidence of hydronephrosis bilaterally. Parenchymal

echogenicity appears grossly within normal limits.

 

Bladder not well evaluated.

## 2018-02-11 VITALS — DIASTOLIC BLOOD PRESSURE: 59 MMHG | SYSTOLIC BLOOD PRESSURE: 121 MMHG

## 2018-02-11 VITALS — DIASTOLIC BLOOD PRESSURE: 64 MMHG | SYSTOLIC BLOOD PRESSURE: 123 MMHG

## 2018-02-11 VITALS — SYSTOLIC BLOOD PRESSURE: 109 MMHG | DIASTOLIC BLOOD PRESSURE: 61 MMHG

## 2018-02-11 VITALS — SYSTOLIC BLOOD PRESSURE: 115 MMHG | DIASTOLIC BLOOD PRESSURE: 60 MMHG

## 2018-02-11 VITALS — DIASTOLIC BLOOD PRESSURE: 70 MMHG | SYSTOLIC BLOOD PRESSURE: 132 MMHG

## 2018-02-11 VITALS — SYSTOLIC BLOOD PRESSURE: 111 MMHG | DIASTOLIC BLOOD PRESSURE: 64 MMHG

## 2018-02-11 LAB
ALBUMIN SERPL-MCNC: 2.6 G/DL (ref 3.4–5)
ALBUMIN/GLOB SERPL: 0.8 {RATIO} (ref 1–2.7)
ALP SERPL-CCNC: 48 U/L (ref 46–116)
ALT SERPL-CCNC: 18 U/L (ref 12–78)
ANION GAP SERPL CALC-SCNC: 6 MMOL/L (ref 5–15)
AST SERPL-CCNC: 21 U/L (ref 15–37)
BILIRUB SERPL-MCNC: 0.9 MG/DL (ref 0.2–1)
BUN SERPL-MCNC: 28 MG/DL (ref 7–18)
CALCIUM SERPL-MCNC: 9 MG/DL (ref 8.5–10.1)
CHLORIDE SERPL-SCNC: 114 MMOL/L (ref 98–107)
CO2 SERPL-SCNC: 20 MMOL/L (ref 21–32)
CREAT SERPL-MCNC: 0.8 MG/DL (ref 0.55–1.3)
GLOBULIN SER-MCNC: 3.3 G/DL
INR PPP: 2.2 (ref 0.9–1.1)
POTASSIUM SERPL-SCNC: 4.4 MMOL/L (ref 3.5–5.1)
SODIUM SERPL-SCNC: 140 MMOL/L (ref 136–145)

## 2018-02-11 RX ADMIN — LACTULOSE SCH GM: 20 SOLUTION ORAL at 17:31

## 2018-02-11 RX ADMIN — INSULIN ASPART SCH UNITS: 100 INJECTION, SOLUTION INTRAVENOUS; SUBCUTANEOUS at 16:15

## 2018-02-11 RX ADMIN — LACTULOSE SCH GM: 20 SOLUTION ORAL at 09:22

## 2018-02-11 RX ADMIN — INSULIN ASPART SCH UNITS: 100 INJECTION, SOLUTION INTRAVENOUS; SUBCUTANEOUS at 11:17

## 2018-02-11 RX ADMIN — INSULIN ASPART SCH UNITS: 100 INJECTION, SOLUTION INTRAVENOUS; SUBCUTANEOUS at 20:33

## 2018-02-11 RX ADMIN — LACTULOSE SCH GM: 20 SOLUTION ORAL at 13:57

## 2018-02-11 RX ADMIN — Medication SCH MLS/HR: at 15:05

## 2018-02-11 RX ADMIN — SODIUM CHLORIDE SCH MLS/HR: 0.9 INJECTION INTRAVENOUS at 13:58

## 2018-02-11 RX ADMIN — INSULIN ASPART SCH UNITS: 100 INJECTION, SOLUTION INTRAVENOUS; SUBCUTANEOUS at 06:03

## 2018-02-11 NOTE — NEPHROLOGY PROGRESS NOTE
Assessment/Plan


Problem List:  


(1) Hepatic encephalopathy


(2) Cirrhosis


(3) ARF (acute renal failure)


(4) Acute CHF


Plan


gibran lab improving, likely prerenal.  Stop iv to avoid fluid overload.  Edema 

from cirrhosis and malnutrition. Continue lactulose for enceph, watch for fluid 

overload/chf





Subjective


ROS Limited/Unobtainable:  Yes





Objective


Objective





Last 24 Hour Vital Signs








  Date Time  Temp Pulse Resp B/P (MAP) Pulse Ox O2 Delivery O2 Flow Rate FiO2


 


2/11/18 08:00  70      


 


2/11/18 08:00 97.5 70 16 132/70 95 Room Air  


 


2/11/18 04:00 98.1 70 16 115/60 95 Room Air  


 


2/11/18 04:00  70      


 


2/11/18 00:00 98.1 70 18 121/59 95 Room Air  


 


2/11/18 00:00  70      


 


2/10/18 20:00 98.2 70 20 123/68 95   


 


2/10/18 20:00  70      


 


2/10/18 16:00  70      


 


2/10/18 16:00 98.1 70 18 116/62 95 Room Air  

















Intake and Output  


 


 2/10/18 2/11/18





 19:00 07:00


 


Intake Total 665 ml 995 ml


 


Output Total 650 ml 


 


Balance 15 ml 995 ml


 


  


 


Intake Oral 540 ml 120 ml


 


IV Total 125 ml 875 ml


 


Output Urine Total 650 ml 


 


# Voids  2


 


# Bowel Movements 1 4








Laboratory Tests


2/11/18 07:48: 


Prothrombin Time 22.9H, Prothromb Time International Ratio 2.2H, Sodium Level 

140, Potassium Level 4.4, Chloride Level 114H, Carbon Dioxide Level 20L, Anion 

Gap 6, Blood Urea Nitrogen 28H, Creatinine 0.8, Estimat Glomerular Filtration 

Rate , Glucose Level 115H, Calcium Level 9.0, Total Bilirubin 0.9, Aspartate 

Amino Transf (AST/SGOT) 21, Alanine Aminotransferase (ALT/SGPT) 18, Alkaline 

Phosphatase 48, Total Protein 5.9L, Albumin 2.6L, Globulin 3.3, Albumin/

Globulin Ratio 0.8L


Height (Feet):  5


Height (Inches):  5.00


Weight (Pounds):  129


General Appearance:  alert


EENT:  normal ENT inspection


Neck:  normal alignment


Cardiovascular:  normal rate, regular rhythm


Respiratory/Chest:  rhonchi - bilaterally


Abdomen:  non tender, distended


Extremities:  moderate edema


Neurologic:  CNs II-XII grossly normal











MUKESH CARO Feb 11, 2018 13:29

## 2018-02-11 NOTE — CONSULTATION
DATE OF CONSULTATION:  02/07/2018



CARDIOLOGY CONSULTATION



CONSULTING PHYSICIAN:  John Cohen M.D.



REQUESTING PHYSICIAN:  Campbell Khalil M.D.



REASON FOR CONSULTATION:  Shock with atrial fibrillation.



HISTORY OF PRESENT ILLNESS:  The patient was seen and evaluated in the

emergency room at the request of Dr. Khalil and her primary care

physician, Dr. Urbina.  She is an 82-year-old [place] female.  She lives in a

skilled nursing facility.  She has a history of dementia, but is verbal

and interactive at baseline.  For the past few days, she has been

increasingly withdrawn, lethargic, and had a poor appetite.  Today, she

was increasingly lethargic and confused.  She did not have any specific

complaints.  There was no fevers or chills.  There was no nausea,

vomiting, diarrhea, cough, congestion, or chest pain.



The patient was seen in the emergency room and noted to have unstable

vital signs prompting this consultation.  The patient is unable to give

any reliable history due to her acute illness as well as a language

barrier.  I spent 20 minutes time speaking with her primary physician and

reviewing her nursing home chart.



PAST MEDICAL HISTORY:  Hypertension, permanent pacemaker, paroxysmal atrial

fibrillation, type 2 diabetes mellitus, cerebrovascular disease with

dementia, osteoarthritis, and osteoporosis.



MEDICATIONS:  Prior to admission, reviewed and reconciled.



ALLERGIES:  None.



SOCIAL HISTORY:  No record of smoking, alcohol, or substance abuse.



FAMILY HISTORY:  Noncontributory.



REVIEW OF SYSTEMS:  She was hospitalized at [facility] about a month

ago.  That hospital course included treatment for an acute infection.  She

has not had any cough.  No nausea, vomiting, or diarrhea.  No positive

influenza.  No change in bowel habits.  Her appetite has been poor.  She

has not had any dysuria.  There is no history of thyroid disorder.  There

is no history of asthma or blood clots in the legs.



PHYSICAL EXAMINATION:

VITAL SIGNS:  Blood pressure 81/45, heart rate 70, respiratory rate 20, and

afebrile.

HEENT:  Temporal wasting.  Pale conjunctivae.  Dry mucous

membranes.

NECK:  Supple.  Jugular venous pressure flat.

LUNGS:  Diminished breath sounds.  No rales.

CARDIAC:  Regular rhythm and rate.  Normal S1.  Paradoxically split S2.

ABDOMEN:  Soft, nontender.

EXTREMITIES:  Without edema.  Capillary refill is diminished.



LABORATORY AND DIAGNOSTIC DATA:  EKG revealed AV sequential pacemaker.

Chest x-ray with mild interstitial edema and upper lumbar compression

fracture.  Sodium 132, potassium 4.3, bicarbonate 20, BUN 66, and

creatinine 3.1.  Albumin 2.6.  Troponin 0.093.  White count 8.4 and

hemoglobin 13.1.



IMPRESSION:

1. Shock.

2. Sepsis.

3. Hypovolemia.

4. Dehydration.

5. Acute renal failure.

6. Metabolic acidosis.

7. Acute non-ST-elevation myocardial infarction, precipitated by

hypoperfusion of this coronary bed.

8. Moderate-to-severe protein-calorie malnutrition.

9. Paroxysmal atrial fibrillation.

10. Permanent pacemaker.



PLAN:

1. Volume resuscitation.  If inadequate response, start pressors with

dopamine.

2. Panculture.

3. Broad-spectrum antibiotics.

4. Aspiration precautions.

5. Respiratory hygiene.

6. Swallow evaluation.

7. Hold antihypertensives.

8. Continue antiplatelet therapy.

9. DVT and stress ulcer prophylaxis.

10. Serial troponin levels.

11. We will obtain pacemaker interrogation if not recently done.









  ______________________________________________

  John Cohen M.D.





DR:  Isela

D:  02/10/2018 22:32

T:  02/11/2018 02:08

JOB#:  [id number]

CC:

## 2018-02-11 NOTE — GENERAL PROGRESS NOTE
Assessment/Plan


Problem List:  


(1) Cirrhosis


ICD Codes:  K74.60 - Unspecified cirrhosis of liver


SNOMED:  74805822


(2) Hepatic encephalopathy


ICD Codes:  K72.90 - Hepatic failure, unspecified without coma


SNOMED:  44527533


(3) Anemia


(4) Acute CHF


(5) Respiratory failure, acute


(6) Hypokalemia


(7) Hypotension


ICD Codes:  I95.9 - Hypotension, unspecified


SNOMED:  54631504


Qualifiers:  


   Qualified Codes:  I95.9 - Hypotension, unspecified


(8) Toxic metabolic encephalopathy


ICD Codes:  G92 - Toxic encephalopathy


SNOMED:  610076107


(9) ARF (acute renal failure)


ICD Codes:  N17.9 - Acute kidney failure, unspecified


SNOMED:  36996114


Qualifiers:  


   Qualified Codes:  N17.9 - Acute kidney failure, unspecified


Status:  stable, progressing


Assessment/Plan


swallow eval noted


pos with caution


monitor labs


iv abx


follow up cultures


monitor cxr


lactulose for elevated ammonia


monitor renal fxn


compliance stressed





Subjective


ROS Limited/Unobtainable:  Yes


Constitutional:  Reports: malaise, weakness


HEENT:  Reports: no symptoms


Cardiovascular:  Reports: no symptoms


Respiratory:  Reports: no symptoms


Gastrointestinal/Abdominal:  Reports: no symptoms


Genitourinary:  Reports: no symptoms


Neurologic/Psychiatric:  Reports: pre-existing deficit


Endocrine:  Reports: no symptoms


Hematologic/Lymphatic:  Reports: no symptoms


Allergies:  


Coded Allergies:  


     No Known Allergies (Unverified , 3/17/14)


All Systems:  reviewed and negative except above


Subjective


no events. bp better. no fever or chills. not compliant with all meds.





Objective





Last 24 Hour Vital Signs








  Date Time  Temp Pulse Resp B/P (MAP) Pulse Ox O2 Delivery O2 Flow Rate FiO2


 


2/11/18 08:00 97.5 70 16 132/70 95 Room Air  


 


2/11/18 04:00 98.1 70 16 115/60 95 Room Air  


 


2/11/18 04:00  70      


 


2/11/18 00:00 98.1 70 18 121/59 95 Room Air  


 


2/11/18 00:00  70      


 


2/10/18 20:00 98.2 70 20 123/68 95   


 


2/10/18 20:00  70      


 


2/10/18 16:00  70      


 


2/10/18 16:00 98.1 70 18 116/62 95 Room Air  


 


2/10/18 12:00 97.3 70 20 107/72 99 Room Air  


 


2/10/18 12:00  70      

















Intake and Output  


 


 2/10/18 2/11/18





 19:00 07:00


 


Intake Total 665 ml 995 ml


 


Output Total 650 ml 


 


Balance 15 ml 995 ml


 


  


 


Intake Oral 540 ml 120 ml


 


IV Total 125 ml 875 ml


 


Output Urine Total 650 ml 


 


# Voids  2


 


# Bowel Movements 1 4








Laboratory Tests


2/11/18 07:48: 


Prothrombin Time [Pending], Prothromb Time International Ratio [Pending], 

Sodium Level [Pending], Potassium Level [Pending], Chloride Level [Pending], 

Carbon Dioxide Level [Pending], Blood Urea Nitrogen [Pending], Creatinine [

Pending], Estimat Glomerular Filtration Rate [Pending], Glucose Level [Pending]

, Calcium Level [Pending], Total Bilirubin [Pending], Aspartate Amino Transf (

AST/SGOT) [Pending], Alanine Aminotransferase (ALT/SGPT) [Pending], Alkaline 

Phosphatase [Pending], Total Protein [Pending], Albumin [Pending], Globulin [

Pending]


Height (Feet):  5


Height (Inches):  5.00


Weight (Pounds):  129


Objective


General Appearance:  WD/WN, alert


Neck:  supple


Cardiovascular:  normal rate, regular rhythm


Respiratory/Chest:  chest wall non-tender, lungs clear, normal breath sounds, 

no respiratory distress


Abdomen:  normal bowel sounds, non tender, soft, no organomegaly


Edema:  no edema noted Arm (L), no edema noted Arm (R), no edema noted Leg (L), 

no edema noted Leg (R), no edema noted Pedal (L), no edema noted Pedal (R), no 

edema noted Generalized











EDUAR PAT Feb 11, 2018 08:42

## 2018-02-11 NOTE — PROGRESS NOTE
DATE:  02/10/2018



CARDIOLOGY PROGRESS NOTE



SUBJECTIVE:  The patient is complaining of abdominal pain.  She is also

complaining of irritation from her Ybarra catheter.  Oral intake is fair.



PHYSICAL EXAMINATION:

VITAL SIGNS:  Blood pressure 107/72, pulse 70, respiratory rate 20, and

afebrile.  Monitor reveals AV paced rhythm.

LUNGS:  Few rhonchi.

HEART:  Regular rhythm and rate.  Normal S1, S2.  A 1/6 systolic murmur at

apex.

ABDOMEN:  Soft.

EXTREMITIES:  With trace edema.



LABORATORY DATA:  White count 6.8 and hemoglobin 12.5.  Ammonia 54.

Pro-natriuretic peptide 7700.  BUN 40, creatinine 1.0, bicarb 18, and

potassium 4.3.  Albumin 2.5.



IMPRESSION:

1. Chronic liver disease.

2. Elevated ammonia, improved.

3. Acute renal failure, resolved.

4. Severe protein-calorie malnutrition.

5. Acute on chronic diastolic congestive heart failure.

6. Ischemic and hypertensive cardiomyopathy.

7. Metabolic acidosis.

8. Hypovolemia, resolved.

9. Sepsis with recovered shock.

10. Permanent pacemaker with atrial fibrillation.



PLAN:

1. DC intravenous fluids.

2. Maintain adequate oral intake.

3. Awaiting records regarding pacemaker data.

4. Titrate lactulose.

5. Titrate anti-failure regimen.

6. Antibiotics per Infectious Disease consultant.

7. Discontinue Ybarra catheter.

8. Consider abdominal CAT scan if abdominal pain persists.









  ______________________________________________

  John Cohen M.D. DR:  MARQUITA

D:  02/10/2018 22:43

T:  02/11/2018 02:17

JOB#:  [id number]

CC:

## 2018-02-12 VITALS — DIASTOLIC BLOOD PRESSURE: 63 MMHG | SYSTOLIC BLOOD PRESSURE: 124 MMHG

## 2018-02-12 VITALS — DIASTOLIC BLOOD PRESSURE: 71 MMHG | SYSTOLIC BLOOD PRESSURE: 130 MMHG

## 2018-02-12 VITALS — SYSTOLIC BLOOD PRESSURE: 119 MMHG | DIASTOLIC BLOOD PRESSURE: 66 MMHG

## 2018-02-12 VITALS — DIASTOLIC BLOOD PRESSURE: 65 MMHG | SYSTOLIC BLOOD PRESSURE: 119 MMHG

## 2018-02-12 LAB
ALBUMIN SERPL-MCNC: 2.8 G/DL (ref 3.4–5)
ALBUMIN/GLOB SERPL: 0.9 {RATIO} (ref 1–2.7)
ALP SERPL-CCNC: 52 U/L (ref 46–116)
ALT SERPL-CCNC: 19 U/L (ref 12–78)
AMMONIA PLAS-SCNC: 53 UMOL/L (ref 11–32)
ANION GAP SERPL CALC-SCNC: 12 MMOL/L (ref 5–15)
AST SERPL-CCNC: 21 U/L (ref 15–37)
BILIRUB SERPL-MCNC: 0.9 MG/DL (ref 0.2–1)
BUN SERPL-MCNC: 23 MG/DL (ref 7–18)
CALCIUM SERPL-MCNC: 9.6 MG/DL (ref 8.5–10.1)
CHLORIDE SERPL-SCNC: 111 MMOL/L (ref 98–107)
CO2 SERPL-SCNC: 19 MMOL/L (ref 21–32)
CREAT SERPL-MCNC: 0.9 MG/DL (ref 0.55–1.3)
GLOBULIN SER-MCNC: 3 G/DL
INR PPP: 2.2 (ref 0.9–1.1)
POTASSIUM SERPL-SCNC: 4 MMOL/L (ref 3.5–5.1)
SODIUM SERPL-SCNC: 142 MMOL/L (ref 136–145)

## 2018-02-12 RX ADMIN — LACTULOSE SCH GM: 20 SOLUTION ORAL at 12:29

## 2018-02-12 RX ADMIN — LACTULOSE SCH GM: 20 SOLUTION ORAL at 09:58

## 2018-02-12 RX ADMIN — SODIUM CHLORIDE SCH MLS/HR: 0.9 INJECTION INTRAVENOUS at 15:31

## 2018-02-12 RX ADMIN — INSULIN ASPART SCH UNITS: 100 INJECTION, SOLUTION INTRAVENOUS; SUBCUTANEOUS at 06:30

## 2018-02-12 RX ADMIN — INSULIN ASPART SCH UNITS: 100 INJECTION, SOLUTION INTRAVENOUS; SUBCUTANEOUS at 11:30

## 2018-02-12 NOTE — DIAGNOSTIC IMAGING REPORT
Indication: Dysphasia

 

Procedure and findings:

 

Real-time fluoroscopic imaging performed in a lateral projection in conjunction with

the speech pathologist evaluation. Variable consistencies of barium given per mouth.

 

Findings: 

 

Significant abnormalities of both oral and pharyngeal phases of swallowing are

demonstrated.

 

Total fluoroscopic time 306 seconds. No aspiration or penetration definitely seen.

 

Abnormal video swallow. Please refer to speech pathology evaluation for more

information.

## 2018-02-12 NOTE — NEPHROLOGY PROGRESS NOTE
Assessment/Plan


Problem List:  


(1) Hepatic encephalopathy


(2) Cirrhosis


(3) ARF (acute renal failure)


(4) Acute CHF


Plan


gibran lab improving, likely prerenal.  Stop iv to avoid fluid overload.  Edema 

from cirrhosis and malnutrition. Continue lactulose for enceph, watch for fluid 

overload/chf,stable for dc





Subjective


ROS Limited/Unobtainable:  Yes





Objective


Objective





Last 24 Hour Vital Signs








  Date Time  Temp Pulse Resp B/P (MAP) Pulse Ox O2 Delivery O2 Flow Rate FiO2


 


2/12/18 12:00 98.8 70 18 119/66 96 Nasal Cannula 2.0 


 


2/12/18 12:00  72      


 


2/12/18 08:00  71      


 


2/12/18 08:00 98.2 79 20 124/63 97 Nasal Cannula 2.0 


 


2/12/18 04:00  70      


 


2/12/18 04:00 98.7 70 21 119/65 91 Room Air  


 


2/12/18 00:00  72      


 


2/12/18 00:00 97.7 70 20 130/71 91 Room Air  


 


2/11/18 20:00  70      


 


2/11/18 20:00 98.4 70 20 123/64 93 Room Air  


 


2/11/18 16:00  70      


 


2/11/18 16:00 97.9 70 20 109/61 96 Room Air  

















Intake and Output  


 


 2/11/18 2/12/18





 19:00 07:00


 


Intake Total 595.000 ml 


 


Output Total 900 ml 


 


Balance -305.000 ml 


 


  


 


Intake Oral 290 ml 


 


IV Total 305.000 ml 


 


Output Urine Total 900 ml 


 


# Voids  1


 


# Bowel Movements 2 2








Laboratory Tests


2/12/18 08:13: 


Prothrombin Time 23.6H, Prothromb Time International Ratio 2.2H, Sodium Level 

142, Potassium Level 4.0, Chloride Level 111H, Carbon Dioxide Level 19L, Anion 

Gap 12, Blood Urea Nitrogen 23H, Creatinine 0.9, Estimat Glomerular Filtration 

Rate , Glucose Level 185H, Calcium Level 9.6, Total Bilirubin 0.9, Aspartate 

Amino Transf (AST/SGOT) 21, Alanine Aminotransferase (ALT/SGPT) 19, Alkaline 

Phosphatase 52, Ammonia 53H, Total Protein 5.8L, Albumin 2.8L, Globulin 3.0, 

Albumin/Globulin Ratio 0.9L


Height (Feet):  5


Height (Inches):  5.00


Weight (Pounds):  129


General Appearance:  no apparent distress, alert


EENT:  normal ENT inspection


Neck:  normal alignment


Cardiovascular:  normal rate, regular rhythm


Respiratory/Chest:  lungs clear


Abdomen:  non tender, soft


Extremities:  trace edema


Neurologic:  CNs II-XII grossly normal











MUKESH CARO Feb 12, 2018 13:08

## 2018-02-12 NOTE — DISCHARGE SUMMARY
DATE OF ADMISSION:  02/07/2018



DATE OF DISCHARGE:  02/12/2018



ADMISSION DIAGNOSES:

1. Shock.

2. Sepsis.

3. Healthcare-associated pneumonia.

4. Hypertension.

5. Diabetes.

6. Congestive heart failure.

7. History of atrial fibrillation.

8. Cirrhosis.



DISCHARGE DIAGNOSES:

1. Shock.

2. Sepsis.

3. Healthcare-associated pneumonia.

4. Hypertension.

5. Diabetes.

6. Congestive heart failure.

7. History of atrial fibrillation.

8. Cirrhosis.



HOSPITAL COURSE:  The patient is a pleasant female, admitted with

complaints of altered mental status.  She was diagnosed with sepsis,

secondary to pneumonia.  She received IV antibiotics.  She was

pancultured.  Chest x-ray improved with antibiotic therapy.  Hospital

course is complicated by hypertension, acute renal failure, improved after

hydration and discontinuation of her blood pressure medications.  The

patient's renal function also improved with hydration.  On discharge, she

was doing well.  She will be discharged back to the skilled nursing

facility.  She will complete five additional days of IV antibiotic

therapy.



DISCHARGE MEDICATIONS:  Please see discharge medication list for discharge

medications.



DIET:  Cardiac, diabetic diet.



ACTIVITY:  Ad-Trini.



FOLLOWUP:  The patient will follow up by her PMD at the skilled nursing

facility.









  ______________________________________________

  Campbell Khalil M.D.





DR:  JENNA

D:  02/12/2018 08:24

T:  02/12/2018 22:25

JOB#:  [id number]

CC:

## 2018-02-12 NOTE — PROGRESS NOTE
DATE:  02/11/2018



CARDIOLOGY PROGRESS NOTE



SUBJECTIVE:  The patient has no fevers or chills.  Complying with

medications.  Tolerating oral intake, and abdominal pain today is

improved.



OBJECTIVE:

VITAL SIGNS:  Blood pressure 123/64, pulse 70, respirations 20, and

afebrile.

LUNGS:  Clear.

CARDIAC:  Regular.  Normal S1, S2.

ABDOMEN:  Soft.

EXTREMITIES:  Trace edema.



LABORATORY DATA:  Sodium 140, potassium 4.4, bicarbonate 20, BUN 28, and

creatinine 0.8.  INR 2.2.



IMPRESSION:

1. Urinary tract infection with sepsis.

2. Recovered shock.

3. Warfarin coagulopathy.

4. Atrial fibrillation.

5. Metabolic acidosis, improved.

6. Moderate to severe protein-calorie malnutrition.

7. Acute on chronic diastolic congestive heart failure.

8. Permanent pacemaker.

9. Acute renal failure, resolved.

10. Hepatic cirrhosis with recovering hepatic encephalopathy.



PLAN:

1. Maintain warfarin to therapeutic INR.

2. Follow up ammonia level and adjust lactulose dose accordingly.

3. Antibiotics per primary care physician.

4. Nutritional support.

5. Pacemaker interrogation if not recently done.

6. Titrate cardiovascular regimen.









  ______________________________________________

  John Cohen M.D. DR:  ELVI

D:  02/12/2018 02:38

T:  02/12/2018 03:04

JOB#:  [id number]

CC:

## 2018-02-13 NOTE — PROGRESS NOTE
DATE:  02/12/2018



CARDIOLOGY PROGRESS NOTE



SUBJECTIVE:  The patient is without nausea or vomiting.  Abdominal pain has

improved.



OBJECTIVE:

VITAL SIGNS:  Blood pressure parameters are stable, now ranging from 119/65

to 124/63, heart rate 70 to 80, respiratory rate 18 to 21, afebrile,

oxygen saturation on room air is 91% to 96%.

LUNGS:  Diminished breath sounds.

HEART:  Irregularly irregular rhythm.  Normal S1, paradoxically split S2.

ABDOMEN:  Soft.

EXTREMITIES:  No edema.



LABORATORY AND DIAGNOSTIC DATA:  Sodium 142, potassium 4, bicarbonate 19,

BUN 23, and creatinine 0.9.  Albumin 2.8.  INR 2.2.



IMPRESSION:

1. Urinary tract infection with sepsis and shock, resolved.

2. Toxic and metabolic encephalopathies, recovered.

3. Atrial fibrillation, rate controlled.

4. Therapeutic INR for cardioembolic prophylaxis.

5. Permanent pacemaker with interrogation to proceed as an outpatient.

6. Diastolic dysfunction with no signs of acute congestive heart

failure.

7. Moderate-to-severe protein-calorie malnutrition on supplemental

feedings.

8. Hepatic cirrhosis with recovering encephalopathy on lactulose.



PLAN:

1. Stable for outpatient care at skilled nursing facility.

2. Discharge medication regimen reviewed and updated.  Titration further

at the skilled nursing facility based on laboratory studies.

3. Pacemaker interrogation as noted to be arranged at the skilled

nursing facility.









  ______________________________________________

  John Cohen M.D.





DRYeimi Reyes

D:  02/12/2018 23:08

T:  02/13/2018 00:22

JOB#:  [id number]

CC: